# Patient Record
Sex: FEMALE | Race: WHITE | NOT HISPANIC OR LATINO | URBAN - METROPOLITAN AREA
[De-identification: names, ages, dates, MRNs, and addresses within clinical notes are randomized per-mention and may not be internally consistent; named-entity substitution may affect disease eponyms.]

---

## 2023-10-23 PROBLEM — F32.A DEPRESSION: Status: ACTIVE | Noted: 2023-10-23

## 2023-10-23 PROBLEM — E66.9 OBESITY: Status: ACTIVE | Noted: 2023-10-23

## 2023-10-23 PROBLEM — R26.2 DIFFICULTY WALKING: Status: ACTIVE | Noted: 2023-10-23

## 2023-10-23 PROBLEM — N89.8 VAGINAL DISCHARGE: Status: ACTIVE | Noted: 2023-10-23

## 2023-10-23 PROBLEM — B37.31 YEAST INFECTION OF THE VAGINA: Status: ACTIVE | Noted: 2023-10-23

## 2023-10-23 RX ORDER — NYSTATIN 100000 U/G
CREAM TOPICAL
COMMUNITY
Start: 2020-08-17

## 2023-10-23 RX ORDER — TERCONAZOLE 80 MG/1
SUPPOSITORY VAGINAL
COMMUNITY
Start: 2020-07-15

## 2023-10-23 RX ORDER — SERTRALINE HYDROCHLORIDE 100 MG/1
TABLET, FILM COATED ORAL
COMMUNITY
Start: 2020-06-26

## 2023-10-23 RX ORDER — NORGESTIMATE AND ETHINYL ESTRADIOL 0.25-0.035
KIT ORAL
COMMUNITY
Start: 2020-06-26

## 2023-10-23 RX ORDER — BUPROPION HYDROCHLORIDE 150 MG/1
TABLET ORAL
COMMUNITY
Start: 2020-06-26

## 2023-10-23 RX ORDER — FLUCONAZOLE 150 MG/1
TABLET ORAL
COMMUNITY
Start: 2020-06-26

## 2024-11-12 ENCOUNTER — APPOINTMENT (OUTPATIENT)
Facility: CLINIC | Age: 39
End: 2024-11-12
Payer: COMMERCIAL

## 2024-11-12 VITALS — HEIGHT: 66 IN | WEIGHT: 273 LBS | BODY MASS INDEX: 43.87 KG/M2

## 2024-11-12 DIAGNOSIS — Z83.79 FAMILY HISTORY OF CROHN'S DISEASE: ICD-10-CM

## 2024-11-12 DIAGNOSIS — R19.7 DIARRHEA, UNSPECIFIED TYPE: Primary | ICD-10-CM

## 2024-11-12 DIAGNOSIS — R10.13 DYSPEPSIA: ICD-10-CM

## 2024-11-12 DIAGNOSIS — Z83.79 FAMILY HISTORY OF CELIAC DISEASE: ICD-10-CM

## 2024-11-12 PROCEDURE — 99204 OFFICE O/P NEW MOD 45 MIN: CPT | Performed by: INTERNAL MEDICINE

## 2024-11-12 PROCEDURE — 1036F TOBACCO NON-USER: CPT | Performed by: INTERNAL MEDICINE

## 2024-11-12 PROCEDURE — 3008F BODY MASS INDEX DOCD: CPT | Performed by: INTERNAL MEDICINE

## 2024-11-12 RX ORDER — ESOMEPRAZOLE MAGNESIUM 40 MG/1
1 CAPSULE, DELAYED RELEASE ORAL DAILY
COMMUNITY

## 2024-11-12 RX ORDER — CARIPRAZINE 1.5 MG/1
1.5 CAPSULE, GELATIN COATED ORAL
COMMUNITY
Start: 2024-04-19 | End: 2025-04-19

## 2024-11-12 RX ORDER — SODIUM PICOSULFATE, MAGNESIUM OXIDE, AND ANHYDROUS CITRIC ACID 12; 3.5; 1 G/175ML; G/175ML; MG/175ML
1 LIQUID ORAL SEE ADMIN INSTRUCTIONS
Qty: 350 ML | Refills: 0 | Status: SHIPPED | OUTPATIENT
Start: 2024-11-12

## 2024-11-12 NOTE — PROGRESS NOTES
Primary Care Provider: Ryan Dumas DO  Referring Provider: No ref. provider found  My final recommendations will be communicated back to the referring physician and/or primary care provider via shared medical records or via US mail.    Chief Complaint (Reason for visit):   Sarah Carmona is a 39 y.o. female who presents for chronic diarrhea    ASSESSMENT AND PLAN     Assessment & Plan  Diarrhea, unspecified type  D/d include IBS, IBD, chronic infections, etc.  The patient has family history of celiac disease and Crohn disease  She has a red flag of nocturnal awakening    -Will order stool test, blood test  -EGD and colonoscopy with biopsies  Orders:    Celiac Panel; Future    Calprotectin Stool; Future    Ova/Para + Giardia/Cryptosporidium Antigen; Future    Stool Pathogen Panel, PCR; Future    C. difficile, PCR; Future    Fecal Fat, Qualitative; Future    Colonoscopy Diagnostic; Future    TSH with reflex to Free T4 if abnormal; Future    CBC; Future    Comprehensive Metabolic Panel; Future    Electrolytes, Stool; Future    Family history of celiac disease    Orders:    Esophagogastroduodenoscopy (EGD); Future    Family history of Crohn's disease    Orders:    Colonoscopy Diagnostic; Future    Dyspepsia    Orders:    Esophagogastroduodenoscopy (EGD); Future    TSH with reflex to Free T4 if abnormal; Future    CBC; Future    Comprehensive Metabolic Panel; Future      I discussed the risks, benefits and alternative(s) of the procedure(s) with the patient. Pt verbalizes understanding and is willing to proceed. All questions were answered.    Morgan Pisano MD, MS  11/12/2024    SUBJECTIVE   HPI: History obtained from patient.  Patient's mother was also present in the room    39-year-old female who comes to see me for diarrhea    Patient states that she has been having diarrhea since 1120s.  She moves her bowels about 2-3 times a day.  Consistency varies from loose watery to some formed stools.  Every time after  "eating, she has to rush to use the bathroom.    Diarrhea Characteristics  + Patient has nocturnal awakening  + She also has some blood in stool  - Patient denies any fevers/chills/night sweats/unintentional weight loss  -Patient denies any fluffy stool or oily shin to the stools    RF  -  denies any new medications or change in medications. Denies any OTC / herbal products.   - Denies metformin or Magnesium supplements  - denies artificial sweetners, excessive caffeine  - still has Gall bladder  - no sick contact, no recent travel, no new pets  - drinks city water  + has family history of IBD and celiac disease      Past Medical History:   Diagnosis Date    Other specified depressive episodes 06/26/2020    Other depression       No past surgical history on file.    Current Outpatient Medications   Medication Sig Dispense Refill    buPROPion XL (Wellbutrin XL) 150 mg 24 hr tablet Take by mouth.      esomeprazole (NexIUM) 40 mg DR capsule Take 1 tablet by mouth once daily.      norgestimate-ethinyl estradioL (Estarylla) 0.25-35 mg-mcg tablet Take by mouth.      sertraline (Zoloft) 100 mg tablet Take by mouth.      Vraylar 1.5 mg capsule Take 1 capsule (1.5 mg) by mouth once daily.      fluconazole (Diflucan) 150 mg tablet Take by mouth. (Patient not taking: Reported on 11/12/2024)      nystatin (Mycostatin) cream Apply to affected areas 2-3 times daily (Patient not taking: Reported on 11/12/2024)      terconazole (Terazol 3) 80 mg vaginal suppository INSERT 1 SUPPOSITORY INTRAVAGINALLY AT BEDTIME FOR 3 NIGHTS. (Patient not taking: Reported on 11/12/2024)       No current facility-administered medications for this visit.       Allergies   Allergen Reactions    Tetanus And Diphtheria Toxoids Anaphylaxis    [place] Unknown     OBJECTIVE   PHYSICAL EXAM:  Ht 1.676 m (5' 6\")   Wt 124 kg (273 lb)   BMI 44.06 kg/m²      LABS/IMAGING/SCOPES  No results found for: \"WBC\", \"HGB\", \"HCT\", \"MCV\", \"PLT\"  No results found for: " "\"GLUCOSE\", \"CALCIUM\", \"NA\", \"K\", \"CO2\", \"CL\", \"BUN\", \"CREATININE\"  No results found for: \"ALT\", \"AST\", \"GGT\", \"ALKPHOS\", \"BILITOT\"        Morgan Pisano MD, MS  11/12/2024  "

## 2024-11-12 NOTE — LETTER
November 12, 2024     Ryan Dumas DO  [id number] [facility] [address]    Patient: Sarah Carmona   YOB: 1985   Date of Visit: 11/12/2024       Dear Dr. Ryan Dumas DO:    Thank you for referring Sarah Carmona to me for evaluation. Below are my notes for this consultation.  If you have questions, please do not hesitate to call me. I look forward to following your patient along with you.       Sincerely,     Morgan Pisano MD, MS      CC: No Recipients  ______________________________________________________________________________________    Primary Care Provider: Ryan Dumas DO  Referring Provider: No ref. provider found  My final recommendations will be communicated back to the referring physician and/or primary care provider via shared medical records or via US mail.    Chief Complaint (Reason for visit):   Sarah Carmona is a 39 y.o. female who presents for chronic diarrhea    ASSESSMENT AND PLAN     Assessment & Plan  Diarrhea, unspecified type  D/d include IBS, IBD, chronic infections, etc.  The patient has family history of celiac disease and Crohn disease  She has a red flag of nocturnal awakening    -Will order stool test, blood test  -EGD and colonoscopy with biopsies  Orders:  •  Celiac Panel; Future  •  Calprotectin Stool; Future  •  Ova/Para + Giardia/Cryptosporidium Antigen; Future  •  Stool Pathogen Panel, PCR; Future  •  C. difficile, PCR; Future  •  Fecal Fat, Qualitative; Future  •  Colonoscopy Diagnostic; Future  •  TSH with reflex to Free T4 if abnormal; Future  •  CBC; Future  •  Comprehensive Metabolic Panel; Future  •  Electrolytes, Stool; Future    Family history of celiac disease    Orders:  •  Esophagogastroduodenoscopy (EGD); Future    Family history of Crohn's disease    Orders:  •  Colonoscopy Diagnostic; Future    Dyspepsia    Orders:  •  Esophagogastroduodenoscopy (EGD); Future  •  TSH with reflex to Free T4 if abnormal; Future  •  CBC; Future  •   Comprehensive Metabolic Panel; Future      I discussed the risks, benefits and alternative(s) of the procedure(s) with the patient. Pt verbalizes understanding and is willing to proceed. All questions were answered.    Morgan Pisano MD, MS  11/12/2024    SUBJECTIVE   HPI: History obtained from patient.  Patient's mother was also present in the room    39-year-old female who comes to see me for diarrhea    Patient states that she has been having diarrhea since 1120s.  She moves her bowels about 2-3 times a day.  Consistency varies from loose watery to some formed stools.  Every time after eating, she has to rush to use the bathroom.    Diarrhea Characteristics  + Patient has nocturnal awakening  + She also has some blood in stool  - Patient denies any fevers/chills/night sweats/unintentional weight loss  -Patient denies any fluffy stool or oily shin to the stools    RF  -  denies any new medications or change in medications. Denies any OTC / herbal products.   - Denies metformin or Magnesium supplements  - denies artificial sweetners, excessive caffeine  - still has Gall bladder  - no sick contact, no recent travel, no new pets  - drinks city water  + has family history of IBD and celiac disease      Past Medical History:   Diagnosis Date   • Other specified depressive episodes 06/26/2020    Other depression       No past surgical history on file.    Current Outpatient Medications   Medication Sig Dispense Refill   • buPROPion XL (Wellbutrin XL) 150 mg 24 hr tablet Take by mouth.     • esomeprazole (NexIUM) 40 mg DR capsule Take 1 tablet by mouth once daily.     • norgestimate-ethinyl estradioL (Estarylla) 0.25-35 mg-mcg tablet Take by mouth.     • sertraline (Zoloft) 100 mg tablet Take by mouth.     • Vraylar 1.5 mg capsule Take 1 capsule (1.5 mg) by mouth once daily.     • fluconazole (Diflucan) 150 mg tablet Take by mouth. (Patient not taking: Reported on 11/12/2024)     • nystatin (Mycostatin) cream Apply to  "affected areas 2-3 times daily (Patient not taking: Reported on 11/12/2024)     • terconazole (Terazol 3) 80 mg vaginal suppository INSERT 1 SUPPOSITORY INTRAVAGINALLY AT BEDTIME FOR 3 NIGHTS. (Patient not taking: Reported on 11/12/2024)       No current facility-administered medications for this visit.       Allergies   Allergen Reactions   • Tetanus And Diphtheria Toxoids Anaphylaxis   • [place] Unknown     OBJECTIVE   PHYSICAL EXAM:  Ht 1.676 m (5' 6\")   Wt 124 kg (273 lb)   BMI 44.06 kg/m²      LABS/IMAGING/SCOPES  No results found for: \"WBC\", \"HGB\", \"HCT\", \"MCV\", \"PLT\"  No results found for: \"GLUCOSE\", \"CALCIUM\", \"NA\", \"K\", \"CO2\", \"CL\", \"BUN\", \"CREATININE\"  No results found for: \"ALT\", \"AST\", \"GGT\", \"ALKPHOS\", \"BILITOT\"        Morgan Pisano MD, MS  11/12/2024  "

## 2024-11-27 ENCOUNTER — LAB (OUTPATIENT)
Dept: LAB | Facility: LAB | Age: 39
End: 2024-11-27
Payer: COMMERCIAL

## 2024-11-27 DIAGNOSIS — R10.13 DYSPEPSIA: ICD-10-CM

## 2024-11-27 DIAGNOSIS — R19.7 DIARRHEA, UNSPECIFIED TYPE: ICD-10-CM

## 2024-11-27 LAB
ALBUMIN SERPL BCP-MCNC: 4 G/DL (ref 3.4–5)
ALP SERPL-CCNC: 74 U/L (ref 33–110)
ALT SERPL W P-5'-P-CCNC: 18 U/L (ref 7–45)
ANION GAP SERPL CALCULATED.3IONS-SCNC: 10 MMOL/L (ref 10–20)
AST SERPL W P-5'-P-CCNC: 15 U/L (ref 9–39)
BILIRUB SERPL-MCNC: 0.3 MG/DL (ref 0–1.2)
BUN SERPL-MCNC: 19 MG/DL (ref 6–23)
CALCIUM SERPL-MCNC: 9.1 MG/DL (ref 8.6–10.3)
CHLORIDE SERPL-SCNC: 104 MMOL/L (ref 98–107)
CO2 SERPL-SCNC: 29 MMOL/L (ref 21–32)
CREAT SERPL-MCNC: 0.92 MG/DL (ref 0.5–1.05)
EGFRCR SERPLBLD CKD-EPI 2021: 81 ML/MIN/1.73M*2
ERYTHROCYTE [DISTWIDTH] IN BLOOD BY AUTOMATED COUNT: 12.8 % (ref 11.5–14.5)
GLIADIN PEPTIDE IGA SER IA-ACNC: <1 U/ML
GLUCOSE SERPL-MCNC: 95 MG/DL (ref 74–99)
HCT VFR BLD AUTO: 41.6 % (ref 36–46)
HGB BLD-MCNC: 13.4 G/DL (ref 12–16)
MCH RBC QN AUTO: 28.3 PG (ref 26–34)
MCHC RBC AUTO-ENTMCNC: 32.2 G/DL (ref 32–36)
MCV RBC AUTO: 88 FL (ref 80–100)
NRBC BLD-RTO: 0 /100 WBCS (ref 0–0)
PLATELET # BLD AUTO: 240 X10*3/UL (ref 150–450)
POTASSIUM SERPL-SCNC: 3.9 MMOL/L (ref 3.5–5.3)
PROT SERPL-MCNC: 6.8 G/DL (ref 6.4–8.2)
RBC # BLD AUTO: 4.74 X10*6/UL (ref 4–5.2)
SODIUM SERPL-SCNC: 139 MMOL/L (ref 136–145)
T4 FREE SERPL-MCNC: 0.72 NG/DL (ref 0.61–1.12)
TSH SERPL-ACNC: 4.39 MIU/L (ref 0.44–3.98)
TTG IGA SER IA-ACNC: <1 U/ML
WBC # BLD AUTO: 5.4 X10*3/UL (ref 4.4–11.3)

## 2024-11-27 PROCEDURE — 83516 IMMUNOASSAY NONANTIBODY: CPT

## 2024-11-27 PROCEDURE — 84443 ASSAY THYROID STIM HORMONE: CPT

## 2024-11-27 PROCEDURE — 80053 COMPREHEN METABOLIC PANEL: CPT

## 2024-11-27 PROCEDURE — 84439 ASSAY OF FREE THYROXINE: CPT

## 2024-11-27 PROCEDURE — 85027 COMPLETE CBC AUTOMATED: CPT

## 2024-11-27 PROCEDURE — 36415 COLL VENOUS BLD VENIPUNCTURE: CPT

## 2024-11-30 LAB
GLIADIN PEPTIDE IGG SER IA-ACNC: <0.56 FLU (ref 0–4.99)
TTG IGG SER IA-ACNC: <0.82 FLU (ref 0–4.99)

## 2025-01-16 ENCOUNTER — APPOINTMENT (OUTPATIENT)
Dept: GASTROENTEROLOGY | Facility: EXTERNAL LOCATION | Age: 40
End: 2025-01-16
Payer: COMMERCIAL

## 2025-01-16 DIAGNOSIS — K44.9 DIAPHRAGMATIC HERNIA WITHOUT OBSTRUCTION OR GANGRENE: ICD-10-CM

## 2025-01-16 DIAGNOSIS — Z83.79 FAMILY HISTORY OF CELIAC DISEASE: ICD-10-CM

## 2025-01-16 DIAGNOSIS — Z83.79 FAMILY HISTORY OF CROHN'S DISEASE: ICD-10-CM

## 2025-01-16 DIAGNOSIS — R10.13 EPIGASTRIC PAIN: ICD-10-CM

## 2025-01-16 DIAGNOSIS — K52.9 NONINFECTIOUS GASTROENTERITIS, UNSPECIFIED TYPE: ICD-10-CM

## 2025-01-16 DIAGNOSIS — R10.13 DYSPEPSIA: ICD-10-CM

## 2025-01-16 DIAGNOSIS — R19.7 DIARRHEA, UNSPECIFIED TYPE: Primary | ICD-10-CM

## 2025-01-16 PROCEDURE — 88305 TISSUE EXAM BY PATHOLOGIST: CPT | Performed by: PATHOLOGY

## 2025-01-16 PROCEDURE — 88305 TISSUE EXAM BY PATHOLOGIST: CPT

## 2025-01-16 PROCEDURE — 43239 EGD BIOPSY SINGLE/MULTIPLE: CPT | Performed by: INTERNAL MEDICINE

## 2025-01-16 PROCEDURE — 45380 COLONOSCOPY AND BIOPSY: CPT | Performed by: INTERNAL MEDICINE

## 2025-01-17 ENCOUNTER — LAB REQUISITION (OUTPATIENT)
Dept: LAB | Facility: HOSPITAL | Age: 40
End: 2025-01-17
Payer: COMMERCIAL

## 2025-01-17 DIAGNOSIS — R19.7 DIARRHEA, UNSPECIFIED: ICD-10-CM

## 2025-01-23 LAB
LABORATORY COMMENT REPORT: NORMAL
PATH REPORT.FINAL DX SPEC: NORMAL
PATH REPORT.GROSS SPEC: NORMAL
PATH REPORT.RELEVANT HX SPEC: NORMAL
PATH REPORT.TOTAL CANCER: NORMAL

## 2025-01-31 NOTE — RESULT ENCOUNTER NOTE
Dear Ms. Carmona:    This message is to inform you the result of the biopsies taken during your recent EGD (upper endoscopy) and colonoscopy.    The biopsies from your stomach were normal. Specifically there was no evidence of H.pylori infection.    The biopsies from the duodenum were normal. This means that there were no signs of infection, inflammation, or celiac sprue on these biopsies.    The biopsies from your colon (large intestine) were normal.  There was no evidence of inflammation or precancerous changes.      We hope this information is helpful to you. Your health and the quality care you receive are important to us. Please call our office should you have any questions or concerns.    Sincerely,    Morgan Pisano MD [facility]  Phone: [phone], option [phone]  Fax: [phone]

## 2025-04-24 ENCOUNTER — APPOINTMENT (OUTPATIENT)
Dept: OBSTETRICS AND GYNECOLOGY | Facility: CLINIC | Age: 40
End: 2025-04-24
Payer: COMMERCIAL

## 2025-04-28 ENCOUNTER — APPOINTMENT (OUTPATIENT)
Dept: GASTROENTEROLOGY | Facility: CLINIC | Age: 40
End: 2025-04-28
Payer: COMMERCIAL

## 2025-05-12 ENCOUNTER — APPOINTMENT (OUTPATIENT)
Dept: GASTROENTEROLOGY | Facility: CLINIC | Age: 40
End: 2025-05-12
Payer: COMMERCIAL

## 2025-06-08 ENCOUNTER — OFFICE VISIT (OUTPATIENT)
Dept: URGENT CARE | Age: 40
End: 2025-06-08
Payer: COMMERCIAL

## 2025-06-08 VITALS
DIASTOLIC BLOOD PRESSURE: 80 MMHG | RESPIRATION RATE: 20 BRPM | TEMPERATURE: 97.9 F | HEART RATE: 91 BPM | SYSTOLIC BLOOD PRESSURE: 129 MMHG | OXYGEN SATURATION: 97 %

## 2025-06-08 DIAGNOSIS — J01.00 ACUTE NON-RECURRENT MAXILLARY SINUSITIS: Primary | ICD-10-CM

## 2025-06-08 DIAGNOSIS — J02.9 SORE THROAT: ICD-10-CM

## 2025-06-08 LAB
POC HUMAN RHINOVIRUS PCR: NEGATIVE
POC INFLUENZA A VIRUS PCR: NEGATIVE
POC INFLUENZA B VIRUS PCR: NEGATIVE
POC RESPIRATORY SYNCYTIAL VIRUS PCR: NEGATIVE
POC STREPTOCOCCUS PYOGENES (GROUP A STREP) PCR: NEGATIVE

## 2025-06-08 RX ORDER — PREDNISONE 20 MG/1
20 TABLET ORAL DAILY
Qty: 5 TABLET | Refills: 0 | Status: SHIPPED | OUTPATIENT
Start: 2025-06-08 | End: 2025-06-13

## 2025-06-08 RX ORDER — OXYMETAZOLINE HYDROCHLORIDE 0.05 G/100ML
2 SPRAY, METERED NASAL EVERY 12 HOURS PRN
Qty: 30 ML | Refills: 0 | Status: SHIPPED | OUTPATIENT
Start: 2025-06-08 | End: 2025-06-10

## 2025-06-08 ASSESSMENT — ENCOUNTER SYMPTOMS
SORE THROAT: 1
SINUS COMPLAINT: 1

## 2025-06-08 NOTE — PROGRESS NOTES
Subjective   Patient ID: Sarah Carmona is a 39 y.o. female. They present today with a chief complaint of Sinus Problem (6 days) and Sore Throat.    History of Present Illness  Patient is a 39-year-old female complaining of sinus pain and pressure x 5 days.  Patient reports nasal congestion and cough.  She denies any sick contacts.  Patient is taking over-the-counter medications without much relief.      Sinus Problem  Associated symptoms: sore throat    Sore Throat       Past Medical History  Allergies as of 06/08/2025 - Reviewed 06/08/2025   Allergen Reaction Noted   • Tetanus and diphtheria toxoids Anaphylaxis 10/23/2023   • [place] Unknown 10/23/2023       Prescriptions Prior to Admission[1]     Medical History[2]    Surgical History[3]     reports that she has never smoked. She has never used smokeless tobacco. She reports current alcohol use. She reports that she does not use drugs.    Review of Systems  Review of Systems   HENT:  Positive for sore throat.                                   Objective    Vitals:    06/08/25 1000   BP: 129/80   Pulse: 91   Resp: 20   Temp: 36.6 °C (97.9 °F)   SpO2: 97%     Patient's last menstrual period was 05/13/2025 (approximate).    Physical Exam    Procedures    Point of Care Test & Imaging Results from this visit  Results for orders placed or performed in visit on 06/08/25   POCT SPOTFIRE R/ST Panel Mini w/Strep A ([facility]) manually resulted   Result Value Ref Range    POC Group A Strep, PCR Negative Negative    POC Respiratory Syncytial Virus PCR Negative Negative    POC Influenza A Virus PCR Negative Negative    POC Influenza B Virus PCR Negative Negative    POC Human Rhinovirus PCR Negative Negative      Imaging  No results found.    Cardiology, Vascular, and Other Imaging  No other imaging results found for the past 2 days      Diagnostic study results (if any) were reviewed by Keely Waterman MD.    Assessment/Plan   Allergies, medications, history, and pertinent  labs/EKGs/Imaging reviewed by Keely Waterman MD.     Medical Decision Making  ***    Orders and Diagnoses  Diagnoses and all orders for this visit:  Sore throat  -     POCT SPOTFIRE R/ST Panel Mini w/Strep A ([facility]) manually resulted      Medical Admin Record      Patient disposition: { Disposition:[id number]}    Electronically signed by Keely Waterman MD  10:55 AM             [1]  (Not in a hospital admission)  [2]  Past Medical History:  Diagnosis Date   • Other specified depressive episodes 06/26/2020    Other depression   [3]  No past surgical history on file.

## 2025-07-01 ENCOUNTER — APPOINTMENT (OUTPATIENT)
Dept: OBSTETRICS AND GYNECOLOGY | Facility: CLINIC | Age: 40
End: 2025-07-01
Payer: COMMERCIAL

## 2025-07-03 ENCOUNTER — APPOINTMENT (OUTPATIENT)
Dept: OBSTETRICS AND GYNECOLOGY | Facility: CLINIC | Age: 40
End: 2025-07-03
Payer: COMMERCIAL

## 2025-07-07 ENCOUNTER — APPOINTMENT (OUTPATIENT)
Dept: OBSTETRICS AND GYNECOLOGY | Facility: CLINIC | Age: 40
End: 2025-07-07
Payer: COMMERCIAL

## 2025-08-13 ENCOUNTER — APPOINTMENT (OUTPATIENT)
Dept: OBSTETRICS AND GYNECOLOGY | Facility: CLINIC | Age: 40
End: 2025-08-13
Payer: COMMERCIAL

## 2025-08-27 RX ORDER — ARIPIPRAZOLE 2 MG/1
1 TABLET ORAL
COMMUNITY
Start: 2025-03-05

## 2025-08-27 RX ORDER — BUPROPION HYDROCHLORIDE 300 MG/1
TABLET ORAL
COMMUNITY
Start: 2025-07-06

## 2025-08-27 RX ORDER — OLANZAPINE 2.5 MG/1
2.5 TABLET, FILM COATED ORAL NIGHTLY
COMMUNITY
Start: 2025-06-03

## 2025-08-28 ENCOUNTER — APPOINTMENT (OUTPATIENT)
Dept: GASTROENTEROLOGY | Facility: CLINIC | Age: 40
End: 2025-08-28
Payer: COMMERCIAL

## 2025-08-28 VITALS — HEIGHT: 66 IN | HEART RATE: 95 BPM | OXYGEN SATURATION: 96 % | WEIGHT: 293 LBS | BODY MASS INDEX: 47.09 KG/M2

## 2025-08-28 DIAGNOSIS — R11.2 NAUSEA AND VOMITING, UNSPECIFIED VOMITING TYPE: ICD-10-CM

## 2025-08-28 DIAGNOSIS — R10.13 DYSPEPSIA: ICD-10-CM

## 2025-08-28 DIAGNOSIS — R19.7 DIARRHEA, UNSPECIFIED TYPE: Primary | ICD-10-CM

## 2025-08-28 PROCEDURE — 3008F BODY MASS INDEX DOCD: CPT | Performed by: INTERNAL MEDICINE

## 2025-08-28 PROCEDURE — 99214 OFFICE O/P EST MOD 30 MIN: CPT | Performed by: INTERNAL MEDICINE

## 2025-08-28 RX ORDER — NORTRIPTYLINE HYDROCHLORIDE 10 MG/1
10 CAPSULE ORAL NIGHTLY
Qty: 10 CAPSULE | Refills: 0 | Status: SHIPPED | OUTPATIENT
Start: 2025-08-28 | End: 2025-09-07

## 2025-08-28 RX ORDER — NORTRIPTYLINE HYDROCHLORIDE 25 MG/1
25 CAPSULE ORAL NIGHTLY
Qty: 90 CAPSULE | Refills: 1 | Status: SHIPPED | OUTPATIENT
Start: 2025-08-28 | End: 2026-08-28

## 2025-08-30 ENCOUNTER — LAB (OUTPATIENT)
Dept: LAB | Facility: HOSPITAL | Age: 40
End: 2025-08-30
Payer: COMMERCIAL

## 2025-08-30 LAB — COTININE UR QL SCN: NEGATIVE

## 2025-09-02 ENCOUNTER — APPOINTMENT (OUTPATIENT)
Dept: RADIOLOGY | Facility: HOSPITAL | Age: 40
End: 2025-09-02
Payer: COMMERCIAL